# Patient Record
Sex: FEMALE | Race: WHITE | NOT HISPANIC OR LATINO
[De-identification: names, ages, dates, MRNs, and addresses within clinical notes are randomized per-mention and may not be internally consistent; named-entity substitution may affect disease eponyms.]

---

## 2018-01-26 ENCOUNTER — NON-APPOINTMENT (OUTPATIENT)
Age: 33
End: 2018-01-26

## 2018-01-26 ENCOUNTER — APPOINTMENT (OUTPATIENT)
Dept: FAMILY MEDICINE | Facility: CLINIC | Age: 33
End: 2018-01-26
Payer: COMMERCIAL

## 2018-01-26 VITALS
HEART RATE: 74 BPM | OXYGEN SATURATION: 98 % | DIASTOLIC BLOOD PRESSURE: 84 MMHG | HEIGHT: 66 IN | WEIGHT: 192 LBS | BODY MASS INDEX: 30.86 KG/M2 | SYSTOLIC BLOOD PRESSURE: 110 MMHG

## 2018-01-26 DIAGNOSIS — Z86.39 PERSONAL HISTORY OF OTHER ENDOCRINE, NUTRITIONAL AND METABOLIC DISEASE: ICD-10-CM

## 2018-01-26 DIAGNOSIS — Z83.49 FAMILY HISTORY OF OTHER ENDOCRINE, NUTRITIONAL AND METABOLIC DISEASES: ICD-10-CM

## 2018-01-26 DIAGNOSIS — Z82.49 FAMILY HISTORY OF ISCHEMIC HEART DISEASE AND OTHER DISEASES OF THE CIRCULATORY SYSTEM: ICD-10-CM

## 2018-01-26 DIAGNOSIS — Z83.3 FAMILY HISTORY OF DIABETES MELLITUS: ICD-10-CM

## 2018-01-26 DIAGNOSIS — J45.990 EXERCISE INDUCED BRONCHOSPASM: ICD-10-CM

## 2018-01-26 DIAGNOSIS — Z80.0 FAMILY HISTORY OF MALIGNANT NEOPLASM OF DIGESTIVE ORGANS: ICD-10-CM

## 2018-01-26 DIAGNOSIS — Z82.5 FAMILY HISTORY OF ASTHMA AND OTHER CHRONIC LOWER RESPIRATORY DISEASES: ICD-10-CM

## 2018-01-26 DIAGNOSIS — Z83.1 FAMILY HISTORY OF OTHER INFECTIOUS AND PARASITIC DISEASES: ICD-10-CM

## 2018-01-26 PROCEDURE — 36415 COLL VENOUS BLD VENIPUNCTURE: CPT

## 2018-01-26 PROCEDURE — 93000 ELECTROCARDIOGRAM COMPLETE: CPT

## 2018-01-26 PROCEDURE — 99204 OFFICE O/P NEW MOD 45 MIN: CPT | Mod: 25

## 2018-01-29 ENCOUNTER — OTHER (OUTPATIENT)
Age: 33
End: 2018-01-29

## 2018-01-30 ENCOUNTER — OTHER (OUTPATIENT)
Age: 33
End: 2018-01-30

## 2018-01-31 LAB
ALBUMIN SERPL ELPH-MCNC: 4.1 G/DL
ALP BLD-CCNC: 69 U/L
ALT SERPL-CCNC: 16 U/L
ANION GAP SERPL CALC-SCNC: 10 MMOL/L
AST SERPL-CCNC: 19 U/L
BASOPHILS # BLD AUTO: 0.04 K/UL
BASOPHILS NFR BLD AUTO: 0.8 %
BILIRUB SERPL-MCNC: 0.2 MG/DL
BUN SERPL-MCNC: 10 MG/DL
CALCIUM SERPL-MCNC: 9 MG/DL
CHLORIDE SERPL-SCNC: 103 MMOL/L
CO2 SERPL-SCNC: 27 MMOL/L
CREAT SERPL-MCNC: 0.75 MG/DL
EOSINOPHIL # BLD AUTO: 0.4 K/UL
EOSINOPHIL NFR BLD AUTO: 8 %
GLUCOSE SERPL-MCNC: 80 MG/DL
HCT VFR BLD CALC: 38.9 %
HGB BLD-MCNC: 12.4 G/DL
IMM GRANULOCYTES NFR BLD AUTO: 0 %
LYMPHOCYTES # BLD AUTO: 2.41 K/UL
LYMPHOCYTES NFR BLD AUTO: 47.9 %
MAN DIFF?: NORMAL
MCHC RBC-ENTMCNC: 26.3 PG
MCHC RBC-ENTMCNC: 31.9 GM/DL
MCV RBC AUTO: 82.4 FL
MONOCYTES # BLD AUTO: 0.5 K/UL
MONOCYTES NFR BLD AUTO: 9.9 %
NEUTROPHILS # BLD AUTO: 1.68 K/UL
NEUTROPHILS NFR BLD AUTO: 33.4 %
PLATELET # BLD AUTO: 296 K/UL
POTASSIUM SERPL-SCNC: 4.7 MMOL/L
PROT SERPL-MCNC: 7.5 G/DL
RBC # BLD: 4.72 M/UL
RBC # FLD: 13.7 %
SODIUM SERPL-SCNC: 140 MMOL/L
WBC # FLD AUTO: 5.03 K/UL

## 2018-02-08 ENCOUNTER — APPOINTMENT (OUTPATIENT)
Dept: CARDIOLOGY | Facility: CLINIC | Age: 33
End: 2018-02-08
Payer: COMMERCIAL

## 2018-02-08 ENCOUNTER — NON-APPOINTMENT (OUTPATIENT)
Age: 33
End: 2018-02-08

## 2018-02-08 VITALS
OXYGEN SATURATION: 98 % | BODY MASS INDEX: 25.71 KG/M2 | SYSTOLIC BLOOD PRESSURE: 134 MMHG | DIASTOLIC BLOOD PRESSURE: 88 MMHG | HEIGHT: 66 IN | WEIGHT: 160 LBS | HEART RATE: 111 BPM

## 2018-02-08 DIAGNOSIS — Z87.09 PERSONAL HISTORY OF OTHER DISEASES OF THE RESPIRATORY SYSTEM: ICD-10-CM

## 2018-02-08 PROCEDURE — 99244 OFF/OP CNSLTJ NEW/EST MOD 40: CPT | Mod: 25

## 2018-02-08 PROCEDURE — 93000 ELECTROCARDIOGRAM COMPLETE: CPT

## 2018-02-09 ENCOUNTER — APPOINTMENT (OUTPATIENT)
Dept: FAMILY MEDICINE | Facility: CLINIC | Age: 33
End: 2018-02-09
Payer: COMMERCIAL

## 2018-02-09 VITALS
SYSTOLIC BLOOD PRESSURE: 130 MMHG | WEIGHT: 160 LBS | BODY MASS INDEX: 25.71 KG/M2 | TEMPERATURE: 98 F | HEART RATE: 87 BPM | DIASTOLIC BLOOD PRESSURE: 80 MMHG | HEIGHT: 66 IN | OXYGEN SATURATION: 99 %

## 2018-02-09 DIAGNOSIS — N28.1 CYST OF KIDNEY, ACQUIRED: ICD-10-CM

## 2018-02-09 DIAGNOSIS — Z87.442 PERSONAL HISTORY OF URINARY CALCULI: ICD-10-CM

## 2018-02-09 PROCEDURE — 99213 OFFICE O/P EST LOW 20 MIN: CPT | Mod: 25

## 2018-02-09 PROCEDURE — 36415 COLL VENOUS BLD VENIPUNCTURE: CPT

## 2018-02-09 RX ORDER — ALBUTEROL SULFATE 90 UG/1
108 (90 BASE) AEROSOL, METERED RESPIRATORY (INHALATION)
Qty: 18 | Refills: 0 | Status: DISCONTINUED | COMMUNITY
Start: 2017-11-04 | End: 2018-02-09

## 2018-02-09 RX ORDER — COLCHICINE 0.6 MG/1
0.6 TABLET ORAL
Refills: 0 | Status: DISCONTINUED | COMMUNITY
End: 2018-02-09

## 2018-02-11 ENCOUNTER — MOBILE ON CALL (OUTPATIENT)
Age: 33
End: 2018-02-11

## 2018-02-12 ENCOUNTER — TRANSCRIPTION ENCOUNTER (OUTPATIENT)
Age: 33
End: 2018-02-12

## 2018-02-12 LAB
BASOPHILS # BLD AUTO: 0.03 K/UL
BASOPHILS NFR BLD AUTO: 0.6 %
EOSINOPHIL # BLD AUTO: 0.19 K/UL
EOSINOPHIL NFR BLD AUTO: 3.8 %
HCT VFR BLD CALC: 35.7 %
HGB BLD-MCNC: 11.5 G/DL
IMM GRANULOCYTES NFR BLD AUTO: 0.2 %
LYMPHOCYTES # BLD AUTO: 2.11 K/UL
LYMPHOCYTES NFR BLD AUTO: 42.5 %
MAN DIFF?: NORMAL
MCHC RBC-ENTMCNC: 26.4 PG
MCHC RBC-ENTMCNC: 32.2 GM/DL
MCV RBC AUTO: 82.1 FL
MONOCYTES # BLD AUTO: 0.62 K/UL
MONOCYTES NFR BLD AUTO: 12.5 %
NEUTROPHILS # BLD AUTO: 2.01 K/UL
NEUTROPHILS NFR BLD AUTO: 40.4 %
PLATELET # BLD AUTO: 320 K/UL
RBC # BLD: 4.35 M/UL
RBC # FLD: 14.5 %
WBC # FLD AUTO: 4.97 K/UL

## 2018-03-01 ENCOUNTER — APPOINTMENT (OUTPATIENT)
Dept: CARDIOLOGY | Facility: CLINIC | Age: 33
End: 2018-03-01
Payer: COMMERCIAL

## 2018-03-01 PROCEDURE — 93224 XTRNL ECG REC UP TO 48 HRS: CPT

## 2018-03-14 ENCOUNTER — NON-APPOINTMENT (OUTPATIENT)
Age: 33
End: 2018-03-14

## 2018-03-20 ENCOUNTER — NON-APPOINTMENT (OUTPATIENT)
Age: 33
End: 2018-03-20

## 2018-03-22 ENCOUNTER — NON-APPOINTMENT (OUTPATIENT)
Age: 33
End: 2018-03-22

## 2018-07-23 PROBLEM — Z80.0 FAMILY HISTORY OF COLON CANCER: Status: ACTIVE | Noted: 2018-01-26

## 2018-12-17 ENCOUNTER — NON-APPOINTMENT (OUTPATIENT)
Age: 33
End: 2018-12-17

## 2018-12-17 ENCOUNTER — APPOINTMENT (OUTPATIENT)
Dept: FAMILY MEDICINE | Facility: CLINIC | Age: 33
End: 2018-12-17
Payer: COMMERCIAL

## 2018-12-17 VITALS
OXYGEN SATURATION: 98 % | DIASTOLIC BLOOD PRESSURE: 78 MMHG | SYSTOLIC BLOOD PRESSURE: 118 MMHG | WEIGHT: 178 LBS | BODY MASS INDEX: 28.61 KG/M2 | HEART RATE: 78 BPM | HEIGHT: 66 IN

## 2018-12-17 DIAGNOSIS — M25.562 PAIN IN LEFT KNEE: ICD-10-CM

## 2018-12-17 DIAGNOSIS — I31.9 DISEASE OF PERICARDIUM, UNSPECIFIED: ICD-10-CM

## 2018-12-17 DIAGNOSIS — R00.0 TACHYCARDIA, UNSPECIFIED: ICD-10-CM

## 2018-12-17 PROCEDURE — 36415 COLL VENOUS BLD VENIPUNCTURE: CPT

## 2018-12-17 PROCEDURE — 99395 PREV VISIT EST AGE 18-39: CPT | Mod: 25

## 2018-12-17 PROCEDURE — 99385 PREV VISIT NEW AGE 18-39: CPT | Mod: 25

## 2018-12-17 PROCEDURE — 93000 ELECTROCARDIOGRAM COMPLETE: CPT

## 2018-12-17 RX ORDER — VALACYCLOVIR 500 MG/1
500 TABLET, FILM COATED ORAL
Qty: 21 | Refills: 0 | Status: DISCONTINUED | COMMUNITY
Start: 2017-10-18 | End: 2018-12-17

## 2018-12-17 RX ORDER — IBUPROFEN 800 MG/1
800 TABLET, FILM COATED ORAL
Refills: 0 | Status: DISCONTINUED | COMMUNITY
End: 2018-12-17

## 2018-12-18 LAB
25(OH)D3 SERPL-MCNC: 40.9 NG/ML
ALBUMIN SERPL ELPH-MCNC: 4.3 G/DL
ALP BLD-CCNC: 120 U/L
ALT SERPL-CCNC: 16 U/L
ANION GAP SERPL CALC-SCNC: 10 MMOL/L
APPEARANCE: CLEAR
AST SERPL-CCNC: 19 U/L
BACTERIA: NEGATIVE
BASOPHILS # BLD AUTO: 0.04 K/UL
BASOPHILS NFR BLD AUTO: 0.7 %
BILIRUB SERPL-MCNC: 0.3 MG/DL
BILIRUBIN URINE: NEGATIVE
BLOOD URINE: NEGATIVE
BUN SERPL-MCNC: 10 MG/DL
CALCIUM SERPL-MCNC: 9.6 MG/DL
CHLORIDE SERPL-SCNC: 103 MMOL/L
CHOLEST SERPL-MCNC: 175 MG/DL
CHOLEST/HDLC SERPL: 2.7 RATIO
CO2 SERPL-SCNC: 25 MMOL/L
COLOR: YELLOW
CREAT SERPL-MCNC: 0.78 MG/DL
EOSINOPHIL # BLD AUTO: 0.57 K/UL
EOSINOPHIL NFR BLD AUTO: 10.5 %
FERRITIN SERPL-MCNC: 17 NG/ML
FOLATE SERPL-MCNC: >20 NG/ML
GLUCOSE QUALITATIVE U: NEGATIVE MG/DL
GLUCOSE SERPL-MCNC: 76 MG/DL
HBA1C MFR BLD HPLC: 5 %
HCT VFR BLD CALC: 38.9 %
HDLC SERPL-MCNC: 65 MG/DL
HGB BLD-MCNC: 12.1 G/DL
HYALINE CASTS: 2 /LPF
IMM GRANULOCYTES NFR BLD AUTO: 0 %
IRON SATN MFR SERPL: 9 %
IRON SERPL-MCNC: 32 UG/DL
KETONES URINE: NEGATIVE
LDLC SERPL CALC-MCNC: 97 MG/DL
LEUKOCYTE ESTERASE URINE: NEGATIVE
LYMPHOCYTES # BLD AUTO: 2.02 K/UL
LYMPHOCYTES NFR BLD AUTO: 37.1 %
MAN DIFF?: NORMAL
MCHC RBC-ENTMCNC: 26.7 PG
MCHC RBC-ENTMCNC: 31.1 GM/DL
MCV RBC AUTO: 85.9 FL
MICROSCOPIC-UA: NORMAL
MONOCYTES # BLD AUTO: 0.45 K/UL
MONOCYTES NFR BLD AUTO: 8.3 %
NEUTROPHILS # BLD AUTO: 2.37 K/UL
NEUTROPHILS NFR BLD AUTO: 43.4 %
NITRITE URINE: NEGATIVE
PH URINE: 6
PLATELET # BLD AUTO: 245 K/UL
POTASSIUM SERPL-SCNC: 4.2 MMOL/L
PROT SERPL-MCNC: 7.3 G/DL
PROTEIN URINE: NEGATIVE MG/DL
RBC # BLD: 4.53 M/UL
RBC # FLD: 13.7 %
RED BLOOD CELLS URINE: 1 /HPF
SODIUM SERPL-SCNC: 138 MMOL/L
SPECIFIC GRAVITY URINE: 1.01
SQUAMOUS EPITHELIAL CELLS: 1 /HPF
TIBC SERPL-MCNC: 364 UG/DL
TRIGL SERPL-MCNC: 63 MG/DL
TSH SERPL-ACNC: 1.47 UIU/ML
UIBC SERPL-MCNC: 332 UG/DL
UROBILINOGEN URINE: NEGATIVE MG/DL
VIT B12 SERPL-MCNC: 1068 PG/ML
WBC # FLD AUTO: 5.45 K/UL
WHITE BLOOD CELLS URINE: 1 /HPF

## 2019-03-25 ENCOUNTER — OTHER (OUTPATIENT)
Age: 34
End: 2019-03-25

## 2019-03-25 DIAGNOSIS — Z91.018 ALLERGY TO OTHER FOODS: ICD-10-CM

## 2019-03-25 RX ORDER — EPINEPHRINE 0.3 MG/.3ML
0.3 INJECTION INTRAMUSCULAR
Qty: 1 | Refills: 0 | Status: ACTIVE | COMMUNITY
Start: 2017-11-04 | End: 1900-01-01

## 2019-07-15 ENCOUNTER — OTHER (OUTPATIENT)
Age: 34
End: 2019-07-15

## 2019-09-06 ENCOUNTER — TRANSCRIPTION ENCOUNTER (OUTPATIENT)
Age: 34
End: 2019-09-06

## 2020-02-03 ENCOUNTER — TRANSCRIPTION ENCOUNTER (OUTPATIENT)
Age: 35
End: 2020-02-03

## 2020-02-18 ENCOUNTER — APPOINTMENT (OUTPATIENT)
Dept: FAMILY MEDICINE | Facility: CLINIC | Age: 35
End: 2020-02-18
Payer: COMMERCIAL

## 2020-02-18 ENCOUNTER — NON-APPOINTMENT (OUTPATIENT)
Age: 35
End: 2020-02-18

## 2020-02-18 VITALS
HEART RATE: 70 BPM | DIASTOLIC BLOOD PRESSURE: 82 MMHG | HEIGHT: 66 IN | OXYGEN SATURATION: 98 % | BODY MASS INDEX: 26.16 KG/M2 | WEIGHT: 162.8 LBS | SYSTOLIC BLOOD PRESSURE: 120 MMHG

## 2020-02-18 DIAGNOSIS — N92.6 IRREGULAR MENSTRUATION, UNSPECIFIED: ICD-10-CM

## 2020-02-18 PROCEDURE — 99213 OFFICE O/P EST LOW 20 MIN: CPT | Mod: 25

## 2020-02-18 PROCEDURE — 81025 URINE PREGNANCY TEST: CPT

## 2020-02-18 PROCEDURE — 99395 PREV VISIT EST AGE 18-39: CPT | Mod: 25

## 2020-02-18 PROCEDURE — 93000 ELECTROCARDIOGRAM COMPLETE: CPT

## 2020-02-19 LAB
HCG UR QL: POSITIVE
QUALITY CONTROL: YES

## 2020-03-17 DIAGNOSIS — M79.669 PAIN IN UNSPECIFIED LOWER LEG: ICD-10-CM

## 2020-09-21 ENCOUNTER — TRANSCRIPTION ENCOUNTER (OUTPATIENT)
Age: 35
End: 2020-09-21

## 2020-12-16 PROBLEM — Z87.09 HISTORY OF UPPER RESPIRATORY INFECTION: Status: RESOLVED | Noted: 2018-01-26 | Resolved: 2020-12-16

## 2021-02-22 ENCOUNTER — APPOINTMENT (OUTPATIENT)
Dept: FAMILY MEDICINE | Facility: CLINIC | Age: 36
End: 2021-02-22
Payer: COMMERCIAL

## 2021-02-22 VITALS
SYSTOLIC BLOOD PRESSURE: 122 MMHG | HEIGHT: 66 IN | TEMPERATURE: 97.2 F | HEART RATE: 79 BPM | BODY MASS INDEX: 29.25 KG/M2 | OXYGEN SATURATION: 98 % | DIASTOLIC BLOOD PRESSURE: 80 MMHG | WEIGHT: 182 LBS

## 2021-02-22 DIAGNOSIS — Z13.220 ENCOUNTER FOR SCREENING FOR LIPOID DISORDERS: ICD-10-CM

## 2021-02-22 DIAGNOSIS — Z13.29 ENCOUNTER FOR SCREENING FOR OTHER SUSPECTED ENDOCRINE DISORDER: ICD-10-CM

## 2021-02-22 DIAGNOSIS — Z13.21 ENCOUNTER FOR SCREENING FOR NUTRITIONAL DISORDER: ICD-10-CM

## 2021-02-22 DIAGNOSIS — Z13.1 ENCOUNTER FOR SCREENING FOR DIABETES MELLITUS: ICD-10-CM

## 2021-02-22 DIAGNOSIS — Z00.00 ENCOUNTER FOR GENERAL ADULT MEDICAL EXAMINATION W/OUT ABNORMAL FINDINGS: ICD-10-CM

## 2021-02-22 PROCEDURE — 36415 COLL VENOUS BLD VENIPUNCTURE: CPT

## 2021-02-22 PROCEDURE — 99072 ADDL SUPL MATRL&STAF TM PHE: CPT

## 2021-02-22 PROCEDURE — 99395 PREV VISIT EST AGE 18-39: CPT | Mod: 25

## 2021-02-22 RX ORDER — VALACYCLOVIR 500 MG/1
500 TABLET, FILM COATED ORAL
Refills: 0 | Status: ACTIVE | COMMUNITY

## 2021-02-22 NOTE — HISTORY OF PRESENT ILLNESS
[FreeTextEntry1] : patient here for complete physical [de-identified] : no chest pain, no sob, no cough, no fever, no dizziness, no abdominal pain, no n/v/d/c/melena/brbpr/hematuria/dysuria\par

## 2021-02-22 NOTE — HEALTH RISK ASSESSMENT
[Good] : ~his/her~  mood as  good [No falls in past year] : Patient reported no falls in the past year [No] : In the past 12 months have you used drugs other than those required for medical reasons? No [0] : 2) Feeling down, depressed, or hopeless: Not at all (0) [Patient reported PAP Smear was normal] : Patient reported PAP Smear was normal [None] : None [With Significant Other] : lives with significant other [Employed] : employed [Graduate School] : graduate school [] :  [# Of Children ___] : has [unfilled] children [Sexually Active] : sexually active [Feels Safe at Home] : Feels safe at home [Fully functional (bathing, dressing, toileting, transferring, walking, feeding)] : Fully functional (bathing, dressing, toileting, transferring, walking, feeding) [Fully functional (using the telephone, shopping, preparing meals, housekeeping, doing laundry, using] : Fully functional and needs no help or supervision to perform IADLs (using the telephone, shopping, preparing meals, housekeeping, doing laundry, using transportation, managing medications and managing finances) [] : No [Reports changes in hearing] : Reports no changes in hearing [Reports changes in vision] : Reports no changes in vision [Reports changes in dental health] : Reports no changes in dental health [PapSmearDate] : 7/2020 [FreeTextEntry2] : [place]

## 2021-02-23 LAB
25(OH)D3 SERPL-MCNC: 35.8 NG/ML
ALBUMIN SERPL ELPH-MCNC: 4.5 G/DL
ALP BLD-CCNC: 120 U/L
ALT SERPL-CCNC: 17 U/L
ANION GAP SERPL CALC-SCNC: 13 MMOL/L
APPEARANCE: CLEAR
AST SERPL-CCNC: 21 U/L
BACTERIA: NEGATIVE
BASOPHILS # BLD AUTO: 0.05 K/UL
BASOPHILS NFR BLD AUTO: 1 %
BILIRUB SERPL-MCNC: 0.4 MG/DL
BILIRUBIN URINE: NEGATIVE
BLOOD URINE: NEGATIVE
BUN SERPL-MCNC: 14 MG/DL
CALCIUM SERPL-MCNC: 9.7 MG/DL
CHLORIDE SERPL-SCNC: 101 MMOL/L
CHOLEST SERPL-MCNC: 163 MG/DL
CO2 SERPL-SCNC: 23 MMOL/L
COLOR: NORMAL
CREAT SERPL-MCNC: 0.92 MG/DL
EOSINOPHIL # BLD AUTO: 0.46 K/UL
EOSINOPHIL NFR BLD AUTO: 9.3 %
ESTIMATED AVERAGE GLUCOSE: 108 MG/DL
FERRITIN SERPL-MCNC: 62 NG/ML
FOLATE SERPL-MCNC: >20 NG/ML
GLUCOSE QUALITATIVE U: NEGATIVE
GLUCOSE SERPL-MCNC: 69 MG/DL
HBA1C MFR BLD HPLC: 5.4 %
HCT VFR BLD CALC: 42.7 %
HDLC SERPL-MCNC: 57 MG/DL
HGB BLD-MCNC: 13.9 G/DL
HYALINE CASTS: 1 /LPF
IMM GRANULOCYTES NFR BLD AUTO: 0 %
IRON SATN MFR SERPL: 30 %
IRON SERPL-MCNC: 98 UG/DL
KETONES URINE: NEGATIVE
LDLC SERPL CALC-MCNC: 95 MG/DL
LEUKOCYTE ESTERASE URINE: NEGATIVE
LYMPHOCYTES # BLD AUTO: 2.08 K/UL
LYMPHOCYTES NFR BLD AUTO: 42.1 %
MAN DIFF?: NORMAL
MCHC RBC-ENTMCNC: 29.4 PG
MCHC RBC-ENTMCNC: 32.6 GM/DL
MCV RBC AUTO: 90.3 FL
MICROSCOPIC-UA: NORMAL
MONOCYTES # BLD AUTO: 0.45 K/UL
MONOCYTES NFR BLD AUTO: 9.1 %
NEUTROPHILS # BLD AUTO: 1.9 K/UL
NEUTROPHILS NFR BLD AUTO: 38.5 %
NITRITE URINE: NEGATIVE
NONHDLC SERPL-MCNC: 106 MG/DL
PH URINE: 6
PLATELET # BLD AUTO: 263 K/UL
POTASSIUM SERPL-SCNC: 4.5 MMOL/L
PROT SERPL-MCNC: 7.6 G/DL
PROTEIN URINE: NEGATIVE
RBC # BLD: 4.73 M/UL
RBC # FLD: 13.2 %
RED BLOOD CELLS URINE: 1 /HPF
SARS-COV-2 IGG SERPL IA-ACNC: 0.07 INDEX
SARS-COV-2 IGG SERPL QL IA: NEGATIVE
SODIUM SERPL-SCNC: 137 MMOL/L
SPECIFIC GRAVITY URINE: 1.01
SQUAMOUS EPITHELIAL CELLS: 2 /HPF
TIBC SERPL-MCNC: 325 UG/DL
TRIGL SERPL-MCNC: 56 MG/DL
TSH SERPL-ACNC: 1.27 UIU/ML
UIBC SERPL-MCNC: 227 UG/DL
UROBILINOGEN URINE: NORMAL
VIT B12 SERPL-MCNC: 779 PG/ML
WBC # FLD AUTO: 4.94 K/UL
WHITE BLOOD CELLS URINE: 0 /HPF

## 2021-02-24 ENCOUNTER — NON-APPOINTMENT (OUTPATIENT)
Age: 36
End: 2021-02-24

## 2021-06-21 ENCOUNTER — LABORATORY RESULT (OUTPATIENT)
Age: 36
End: 2021-06-21

## 2021-06-21 ENCOUNTER — APPOINTMENT (OUTPATIENT)
Dept: FAMILY MEDICINE | Facility: CLINIC | Age: 36
End: 2021-06-21
Payer: COMMERCIAL

## 2021-06-21 VITALS
TEMPERATURE: 97.4 F | OXYGEN SATURATION: 98 % | HEIGHT: 66 IN | DIASTOLIC BLOOD PRESSURE: 82 MMHG | BODY MASS INDEX: 27.32 KG/M2 | HEART RATE: 72 BPM | WEIGHT: 170 LBS | SYSTOLIC BLOOD PRESSURE: 128 MMHG

## 2021-06-21 DIAGNOSIS — Z11.59 ENCOUNTER FOR SCREENING FOR OTHER VIRAL DISEASES: ICD-10-CM

## 2021-06-21 PROCEDURE — 99214 OFFICE O/P EST MOD 30 MIN: CPT | Mod: 25

## 2021-06-21 PROCEDURE — 36415 COLL VENOUS BLD VENIPUNCTURE: CPT

## 2021-06-21 PROCEDURE — 99072 ADDL SUPL MATRL&STAF TM PHE: CPT

## 2021-06-21 NOTE — HISTORY OF PRESENT ILLNESS
[FreeTextEntry1] : positive hep c ab and elvated alk phos  [de-identified] : patient had needle stick on 06/04/2021 at work which they did full work up and they noticed abnormal lab tests which patient was told to follow up with PCP. elevated alk phos  and positive hepatitis c\par no chest pain, no sob, no cough, no fever, no dizziness, no abdominal pain, no n/v/d/c/melena/brbpr/hematuria/dysuria\par

## 2021-06-21 NOTE — PHYSICAL EXAM
[No Acute Distress] : no acute distress [Well Nourished] : well nourished [Well Developed] : well developed [Well-Appearing] : well-appearing [Normal Voice/Communication] : normal voice/communication [Normal Sclera/Conjunctiva] : normal sclera/conjunctiva [PERRL] : pupils equal round and reactive to light [Normal Outer Ear/Nose] : the outer ears and nose were normal in appearance [No JVD] : no jugular venous distention [No Respiratory Distress] : no respiratory distress  [No Accessory Muscle Use] : no accessory muscle use [Clear to Auscultation] : lungs were clear to auscultation bilaterally [Normal Rate] : normal rate  [Regular Rhythm] : with a regular rhythm [Normal S1, S2] : normal S1 and S2 [No Murmur] : no murmur heard [No Carotid Bruits] : no carotid bruits [No Abdominal Bruit] : a ~M bruit was not heard ~T in the abdomen [No Varicosities] : no varicosities [Pedal Pulses Present] : the pedal pulses are present [No Edema] : there was no peripheral edema [No Palpable Aorta] : no palpable aorta [No Extremity Clubbing/Cyanosis] : no extremity clubbing/cyanosis [Soft] : abdomen soft [Non Tender] : non-tender [Non-distended] : non-distended [No Masses] : no abdominal mass palpated [No HSM] : no HSM [Normal Bowel Sounds] : normal bowel sounds [No CVA Tenderness] : no CVA  tenderness [No Spinal Tenderness] : no spinal tenderness [Grossly Normal Strength/Tone] : grossly normal strength/tone [No Rash] : no rash [Coordination Grossly Intact] : coordination grossly intact [No Focal Deficits] : no focal deficits [Normal Gait] : normal gait [Speech Grossly Normal] : speech grossly normal [Normal Affect] : the affect was normal [Alert and Oriented x3] : oriented to person, place, and time [Normal Mood] : the mood was normal [Normal Insight/Judgement] : insight and judgment were intact

## 2021-06-24 ENCOUNTER — NON-APPOINTMENT (OUTPATIENT)
Age: 36
End: 2021-06-24

## 2022-01-19 ENCOUNTER — NON-APPOINTMENT (OUTPATIENT)
Age: 37
End: 2022-01-19

## 2022-01-19 ENCOUNTER — APPOINTMENT (OUTPATIENT)
Dept: FAMILY MEDICINE | Facility: CLINIC | Age: 37
End: 2022-01-19
Payer: COMMERCIAL

## 2022-01-19 VITALS
SYSTOLIC BLOOD PRESSURE: 108 MMHG | DIASTOLIC BLOOD PRESSURE: 70 MMHG | OXYGEN SATURATION: 96 % | BODY MASS INDEX: 27.44 KG/M2 | TEMPERATURE: 97.2 F | HEART RATE: 80 BPM | WEIGHT: 170 LBS

## 2022-01-19 DIAGNOSIS — Z86.19 PERSONAL HISTORY OF OTHER INFECTIOUS AND PARASITIC DISEASES: ICD-10-CM

## 2022-01-19 DIAGNOSIS — R74.8 ABNORMAL LEVELS OF OTHER SERUM ENZYMES: ICD-10-CM

## 2022-01-19 DIAGNOSIS — R76.8 OTHER SPECIFIED ABNORMAL IMMUNOLOGICAL FINDINGS IN SERUM: ICD-10-CM

## 2022-01-19 DIAGNOSIS — Z11.59 ENCOUNTER FOR SCREENING FOR OTHER VIRAL DISEASES: ICD-10-CM

## 2022-01-19 PROCEDURE — 99214 OFFICE O/P EST MOD 30 MIN: CPT | Mod: 25

## 2022-01-19 PROCEDURE — 36415 COLL VENOUS BLD VENIPUNCTURE: CPT

## 2022-01-19 NOTE — HISTORY OF PRESENT ILLNESS
[FreeTextEntry1] : patient here for a follow up [de-identified] : no chest pain, no sob, no cough, no fever, no dizziness, no abdominal pain, no n/v/d/c/melena/brbpr/hematuria/dysuria\par would like refill of valtrex for genital herpes - states gets intermittent outbreak around her menstrual cycle

## 2022-01-20 LAB
25(OH)D3 SERPL-MCNC: 31 NG/ML
ALBUMIN SERPL ELPH-MCNC: 4.6 G/DL
ALP BLD-CCNC: 103 U/L
ALT SERPL-CCNC: 13 U/L
ANION GAP SERPL CALC-SCNC: 14 MMOL/L
APPEARANCE: CLEAR
AST SERPL-CCNC: 18 U/L
BACTERIA: ABNORMAL
BASOPHILS # BLD AUTO: 0.06 K/UL
BASOPHILS NFR BLD AUTO: 1.1 %
BILIRUB SERPL-MCNC: 0.4 MG/DL
BILIRUBIN URINE: NEGATIVE
BLOOD URINE: NEGATIVE
BUN SERPL-MCNC: 13 MG/DL
CALCIUM SERPL-MCNC: 9.5 MG/DL
CHLORIDE SERPL-SCNC: 103 MMOL/L
CHOLEST SERPL-MCNC: 171 MG/DL
CO2 SERPL-SCNC: 24 MMOL/L
COLOR: YELLOW
COVID-19 NUCLEOCAPSID  GAM ANTIBODY INTERPRETATION: NEGATIVE
COVID-19 SPIKE DOMAIN ANTIBODY INTERPRETATION: POSITIVE
CREAT SERPL-MCNC: 0.72 MG/DL
EOSINOPHIL # BLD AUTO: 0.42 K/UL
EOSINOPHIL NFR BLD AUTO: 7.6 %
ESTIMATED AVERAGE GLUCOSE: 103 MG/DL
GLUCOSE QUALITATIVE U: NEGATIVE
GLUCOSE SERPL-MCNC: 82 MG/DL
HBA1C MFR BLD HPLC: 5.2 %
HBV CORE IGG+IGM SER QL: NONREACTIVE
HBV CORE IGM SER QL: NONREACTIVE
HBV SURFACE AB SER QL: REACTIVE
HBV SURFACE AG SER QL: NONREACTIVE
HCT VFR BLD CALC: 43.3 %
HCV AB SER QL: NONREACTIVE
HCV S/CO RATIO: 0.16 S/CO
HDLC SERPL-MCNC: 66 MG/DL
HGB BLD-MCNC: 14 G/DL
HIV1+2 AB SPEC QL IA.RAPID: NONREACTIVE
HYALINE CASTS: 2 /LPF
IMM GRANULOCYTES NFR BLD AUTO: 0.2 %
KETONES URINE: NEGATIVE
LDLC SERPL CALC-MCNC: 91 MG/DL
LEUKOCYTE ESTERASE URINE: NEGATIVE
LYMPHOCYTES # BLD AUTO: 2.02 K/UL
LYMPHOCYTES NFR BLD AUTO: 36.7 %
MAN DIFF?: NORMAL
MCHC RBC-ENTMCNC: 29.2 PG
MCHC RBC-ENTMCNC: 32.3 GM/DL
MCV RBC AUTO: 90.4 FL
MICROSCOPIC-UA: NORMAL
MONOCYTES # BLD AUTO: 0.39 K/UL
MONOCYTES NFR BLD AUTO: 7.1 %
NEUTROPHILS # BLD AUTO: 2.6 K/UL
NEUTROPHILS NFR BLD AUTO: 47.3 %
NITRITE URINE: NEGATIVE
NONHDLC SERPL-MCNC: 105 MG/DL
PH URINE: 7.5
PLATELET # BLD AUTO: 277 K/UL
POTASSIUM SERPL-SCNC: 4.2 MMOL/L
PROT SERPL-MCNC: 7.6 G/DL
PROTEIN URINE: NEGATIVE
RBC # BLD: 4.79 M/UL
RBC # FLD: 12.7 %
RED BLOOD CELLS URINE: 1 /HPF
SARS-COV-2 AB SERPL IA-ACNC: >250 U/ML
SARS-COV-2 AB SERPL QL IA: 0.07 INDEX
SODIUM SERPL-SCNC: 140 MMOL/L
SPECIFIC GRAVITY URINE: 1.01
SQUAMOUS EPITHELIAL CELLS: 3 /HPF
T PALLIDUM AB SER QL IA: NEGATIVE
TRIGL SERPL-MCNC: 66 MG/DL
TSH SERPL-ACNC: 1.49 UIU/ML
UROBILINOGEN URINE: NORMAL
WBC # FLD AUTO: 5.5 K/UL
WHITE BLOOD CELLS URINE: 1 /HPF

## 2022-01-21 ENCOUNTER — TRANSCRIPTION ENCOUNTER (OUTPATIENT)
Age: 37
End: 2022-01-21

## 2022-01-24 LAB
ALP BLD-CCNC: 104 IU/L
ALP BONE CFR SERPL: 38 %
ALP INTEST CFR SERPL: 5 %
ALP LIVER CFR SERPL: 57 %

## 2022-01-31 ENCOUNTER — TRANSCRIPTION ENCOUNTER (OUTPATIENT)
Age: 37
End: 2022-01-31

## 2022-02-22 ENCOUNTER — TRANSCRIPTION ENCOUNTER (OUTPATIENT)
Age: 37
End: 2022-02-22

## 2022-02-22 RX ORDER — VALACYCLOVIR 500 MG/1
500 TABLET, FILM COATED ORAL TWICE DAILY
Qty: 90 | Refills: 0 | Status: ACTIVE | COMMUNITY
Start: 2022-01-19 | End: 1900-01-01

## 2022-06-19 ENCOUNTER — NON-APPOINTMENT (OUTPATIENT)
Age: 37
End: 2022-06-19

## 2022-06-30 ENCOUNTER — APPOINTMENT (OUTPATIENT)
Dept: FAMILY MEDICINE | Facility: CLINIC | Age: 37
End: 2022-06-30

## 2022-06-30 ENCOUNTER — NON-APPOINTMENT (OUTPATIENT)
Age: 37
End: 2022-06-30

## 2022-06-30 VITALS
DIASTOLIC BLOOD PRESSURE: 74 MMHG | SYSTOLIC BLOOD PRESSURE: 106 MMHG | BODY MASS INDEX: 27.44 KG/M2 | TEMPERATURE: 97.2 F | HEART RATE: 86 BPM | OXYGEN SATURATION: 97 % | WEIGHT: 170 LBS

## 2022-06-30 PROCEDURE — 99214 OFFICE O/P EST MOD 30 MIN: CPT | Mod: 25

## 2022-06-30 PROCEDURE — 36415 COLL VENOUS BLD VENIPUNCTURE: CPT

## 2022-06-30 NOTE — HISTORY OF PRESENT ILLNESS
[FreeTextEntry8] : patient feeling dizzy for about month\par admits 1 month dizziness with going from sitting or bending to standing , no headaches . no chest pain, no sob, no cough, no fever, no dizziness, no abdominal pain, no n/v/d/c/melena/brbpr/hematuria/dysuria\par \par

## 2022-07-05 LAB
ALBUMIN SERPL ELPH-MCNC: 4 G/DL
ALP BLD-CCNC: 93 U/L
ALT SERPL-CCNC: 15 U/L
ANION GAP SERPL CALC-SCNC: 13 MMOL/L
AST SERPL-CCNC: 30 U/L
BASOPHILS # BLD AUTO: 0.05 K/UL
BASOPHILS NFR BLD AUTO: 0.9 %
BILIRUB SERPL-MCNC: 0.3 MG/DL
BUN SERPL-MCNC: 13 MG/DL
CALCIUM SERPL-MCNC: 9.3 MG/DL
CHLORIDE SERPL-SCNC: 105 MMOL/L
CO2 SERPL-SCNC: 21 MMOL/L
CREAT SERPL-MCNC: 0.65 MG/DL
EGFR: 117 ML/MIN/1.73M2
EOSINOPHIL # BLD AUTO: 0.5 K/UL
EOSINOPHIL NFR BLD AUTO: 8.8 %
GLUCOSE SERPL-MCNC: 92 MG/DL
HCT VFR BLD CALC: 42.3 %
HGB BLD-MCNC: 13.4 G/DL
IMM GRANULOCYTES NFR BLD AUTO: 0.2 %
LYMPHOCYTES # BLD AUTO: 1.99 K/UL
LYMPHOCYTES NFR BLD AUTO: 35.2 %
MAN DIFF?: NORMAL
MCHC RBC-ENTMCNC: 29.1 PG
MCHC RBC-ENTMCNC: 31.7 GM/DL
MCV RBC AUTO: 91.8 FL
MONOCYTES # BLD AUTO: 0.5 K/UL
MONOCYTES NFR BLD AUTO: 8.8 %
NEUTROPHILS # BLD AUTO: 2.6 K/UL
NEUTROPHILS NFR BLD AUTO: 46.1 %
PLATELET # BLD AUTO: 280 K/UL
POTASSIUM SERPL-SCNC: 5.1 MMOL/L
PROT SERPL-MCNC: 7.3 G/DL
RBC # BLD: 4.61 M/UL
RBC # FLD: 13.2 %
SODIUM SERPL-SCNC: 140 MMOL/L
WBC # FLD AUTO: 5.65 K/UL

## 2022-07-21 ENCOUNTER — NON-APPOINTMENT (OUTPATIENT)
Age: 37
End: 2022-07-21

## 2022-07-21 ENCOUNTER — APPOINTMENT (OUTPATIENT)
Dept: CARDIOLOGY | Facility: CLINIC | Age: 37
End: 2022-07-21

## 2022-07-21 VITALS
HEIGHT: 66 IN | HEART RATE: 84 BPM | BODY MASS INDEX: 27.32 KG/M2 | DIASTOLIC BLOOD PRESSURE: 78 MMHG | WEIGHT: 170 LBS | SYSTOLIC BLOOD PRESSURE: 120 MMHG

## 2022-07-21 DIAGNOSIS — R42 DIZZINESS AND GIDDINESS: ICD-10-CM

## 2022-07-21 PROCEDURE — 93000 ELECTROCARDIOGRAM COMPLETE: CPT

## 2022-07-21 PROCEDURE — 99204 OFFICE O/P NEW MOD 45 MIN: CPT | Mod: 25

## 2022-07-21 NOTE — HISTORY OF PRESENT ILLNESS
[FreeTextEntry1] : Pt is a 37 y/o F who is referred here today by their PCP for evaluation of dizziness. For the past few months she has felt dizzy when she changes position quickly (usually squatting to standing) which lasts seconds.  Usually pt is out in the heat gardening when this happens.  She denies CP, SOB, diaphoresis, palpitations, syncope, LE edema, PND, orthopnea. \par TTE done 01/24/2018 showed normal LV function EF = 55-60%, no valvular pathology\par COVID vaccine 08/2021\par \par PMH: none\par Smoking status: never\par Current exercise: 2-3 days per week elliptical\par Daily water intake: 64 oz\par Daily caffeine intake: none recently\par OTC medications: ibuprofen PRN\par Family hx: father CABG 73, mother lung Ca, brother HTN/HLD\par Previous hospitalizations: never\par

## 2022-07-21 NOTE — DISCUSSION/SUMMARY
[FreeTextEntry1] : Pt is a 37 y/o F with postural dizziness\par Given pt's symptoms will check espinoza monitor to assess for ectopy.  Advised adequate hydration.  Drug use, OTC medication use, caffeine consumption reviewed\par .ehco\par will perform ETT to assess patient's current cardiac reserve to incremental activity and check for provocable ECG changes.\par Advise to stay well hydrated, change position slowly, avoid heat\par Advised pt to go to the nearest ED if symptoms persist or worsen\par The described plan was discussed with the pt.  All questions and concerns were addressed to the best of my knowledge.\par

## 2022-07-21 NOTE — PHYSICAL EXAM
[Well Developed] : well developed [Well Nourished] : well nourished [No Acute Distress] : no acute distress [Normal Conjunctiva] : normal conjunctiva [Normal Venous Pressure] : normal venous pressure [No Carotid Bruit] : no carotid bruit [Normal S1, S2] : normal S1, S2 [No Murmur] : no murmur [No Rub] : no rub [No Gallop] : no gallop [Clear Lung Fields] : clear lung fields [Good Air Entry] : good air entry [No Respiratory Distress] : no respiratory distress  [Soft] : abdomen soft [Non Tender] : non-tender [No Masses/organomegaly] : no masses/organomegaly [Normal Bowel Sounds] : normal bowel sounds [Normal Gait] : normal gait [No Edema] : no edema [No Cyanosis] : no cyanosis show [No Clubbing] : no clubbing [No Varicosities] : no varicosities [No Rash] : no rash [No Skin Lesions] : no skin lesions [Moves all extremities] : moves all extremities [No Focal Deficits] : no focal deficits [Normal Speech] : normal speech [Alert and Oriented] : alert and oriented [Normal memory] : normal memory

## 2022-08-10 ENCOUNTER — APPOINTMENT (OUTPATIENT)
Age: 37
End: 2022-08-10

## 2022-08-17 ENCOUNTER — APPOINTMENT (OUTPATIENT)
Dept: CARDIOLOGY | Facility: CLINIC | Age: 37
End: 2022-08-17

## 2023-09-27 ENCOUNTER — APPOINTMENT (OUTPATIENT)
Dept: FAMILY MEDICINE | Facility: CLINIC | Age: 38
End: 2023-09-27
Payer: COMMERCIAL

## 2023-09-27 VITALS
WEIGHT: 165 LBS | DIASTOLIC BLOOD PRESSURE: 84 MMHG | HEIGHT: 66 IN | OXYGEN SATURATION: 96 % | HEART RATE: 90 BPM | SYSTOLIC BLOOD PRESSURE: 120 MMHG | BODY MASS INDEX: 26.52 KG/M2

## 2023-09-27 DIAGNOSIS — D70.9 NEUTROPENIA, UNSPECIFIED: ICD-10-CM

## 2023-09-27 DIAGNOSIS — D64.9 ANEMIA, UNSPECIFIED: ICD-10-CM

## 2023-09-27 DIAGNOSIS — J15.9 UNSPECIFIED BACTERIAL PNEUMONIA: ICD-10-CM

## 2023-09-27 PROCEDURE — 99213 OFFICE O/P EST LOW 20 MIN: CPT

## 2023-09-28 LAB
ALBUMIN SERPL ELPH-MCNC: 4.3 G/DL
ALP BLD-CCNC: 91 U/L
ALT SERPL-CCNC: 26 U/L
ANION GAP SERPL CALC-SCNC: 12 MMOL/L
AST SERPL-CCNC: 20 U/L
BASOPHILS # BLD AUTO: 0.07 K/UL
BASOPHILS NFR BLD AUTO: 1.1 %
BILIRUB SERPL-MCNC: 0.2 MG/DL
BUN SERPL-MCNC: 12 MG/DL
CALCIUM SERPL-MCNC: 9.8 MG/DL
CHLORIDE SERPL-SCNC: 101 MMOL/L
CHOLEST SERPL-MCNC: 143 MG/DL
CO2 SERPL-SCNC: 26 MMOL/L
CREAT SERPL-MCNC: 0.75 MG/DL
EGFR: 104 ML/MIN/1.73M2
EOSINOPHIL # BLD AUTO: 0.41 K/UL
EOSINOPHIL NFR BLD AUTO: 6.3 %
GLUCOSE SERPL-MCNC: 78 MG/DL
HCT VFR BLD CALC: 38 %
HDLC SERPL-MCNC: 40 MG/DL
HGB BLD-MCNC: 11.4 G/DL
IMM GRANULOCYTES NFR BLD AUTO: 0.2 %
LDLC SERPL CALC-MCNC: 88 MG/DL
LYMPHOCYTES # BLD AUTO: 2.47 K/UL
LYMPHOCYTES NFR BLD AUTO: 38.2 %
MAN DIFF?: NORMAL
MCHC RBC-ENTMCNC: 24.4 PG
MCHC RBC-ENTMCNC: 30 GM/DL
MCV RBC AUTO: 81.4 FL
MONOCYTES # BLD AUTO: 0.57 K/UL
MONOCYTES NFR BLD AUTO: 8.8 %
NEUTROPHILS # BLD AUTO: 2.93 K/UL
NEUTROPHILS NFR BLD AUTO: 45.4 %
NONHDLC SERPL-MCNC: 103 MG/DL
PLATELET # BLD AUTO: 357 K/UL
POTASSIUM SERPL-SCNC: 4.4 MMOL/L
PROT SERPL-MCNC: 7.5 G/DL
RBC # BLD: 4.67 M/UL
RBC # FLD: 14.6 %
SODIUM SERPL-SCNC: 139 MMOL/L
TRIGL SERPL-MCNC: 75 MG/DL
TSH SERPL-ACNC: 1.1 UIU/ML
WBC # FLD AUTO: 6.46 K/UL

## 2023-09-29 LAB
APPEARANCE: CLEAR
BACTERIA: NEGATIVE /HPF
BILIRUBIN URINE: NEGATIVE
BLOOD URINE: NEGATIVE
CAST: 0 /LPF
COLOR: YELLOW
EPITHELIAL CELLS: 1 /HPF
ESTIMATED AVERAGE GLUCOSE: 114 MG/DL
GLUCOSE QUALITATIVE U: NEGATIVE MG/DL
HBA1C MFR BLD HPLC: 5.6 %
KETONES URINE: NEGATIVE MG/DL
LEUKOCYTE ESTERASE URINE: NEGATIVE
MICROSCOPIC-UA: NORMAL
NITRITE URINE: NEGATIVE
PH URINE: 6.5
PROTEIN URINE: NEGATIVE MG/DL
RED BLOOD CELLS URINE: 0 /HPF
SPECIFIC GRAVITY URINE: 1.01
UROBILINOGEN URINE: 0.2 MG/DL
WHITE BLOOD CELLS URINE: 0 /HPF

## 2023-10-26 LAB
BASOPHILS # BLD AUTO: 0.08 K/UL
BASOPHILS NFR BLD AUTO: 0.9 %
EOSINOPHIL # BLD AUTO: 0.44 K/UL
EOSINOPHIL NFR BLD AUTO: 5.1 %
FERRITIN SERPL-MCNC: 10 NG/ML
FOLATE SERPL-MCNC: 15.1 NG/ML
HCT VFR BLD CALC: 38.9 %
HGB BLD-MCNC: 12.3 G/DL
IMM GRANULOCYTES NFR BLD AUTO: 0.2 %
IRON SATN MFR SERPL: 13 %
IRON SERPL-MCNC: 55 UG/DL
LYMPHOCYTES # BLD AUTO: 3.43 K/UL
LYMPHOCYTES NFR BLD AUTO: 40 %
MAN DIFF?: NORMAL
MCHC RBC-ENTMCNC: 25.3 PG
MCHC RBC-ENTMCNC: 31.6 GM/DL
MCV RBC AUTO: 80 FL
MONOCYTES # BLD AUTO: 0.73 K/UL
MONOCYTES NFR BLD AUTO: 8.5 %
NEUTROPHILS # BLD AUTO: 3.87 K/UL
NEUTROPHILS NFR BLD AUTO: 45.3 %
PLATELET # BLD AUTO: 309 K/UL
RBC # BLD: 4.86 M/UL
RBC # FLD: 14.6 %
TIBC SERPL-MCNC: 437 UG/DL
UIBC SERPL-MCNC: 382 UG/DL
VIT B12 SERPL-MCNC: 960 PG/ML
WBC # FLD AUTO: 8.57 K/UL

## 2023-11-11 ENCOUNTER — NON-APPOINTMENT (OUTPATIENT)
Age: 38
End: 2023-11-11

## 2023-12-12 ENCOUNTER — APPOINTMENT (OUTPATIENT)
Age: 38
End: 2023-12-12
Payer: COMMERCIAL

## 2023-12-12 VITALS
SYSTOLIC BLOOD PRESSURE: 150 MMHG | BODY MASS INDEX: 26.63 KG/M2 | HEART RATE: 74 BPM | OXYGEN SATURATION: 98 % | WEIGHT: 165 LBS | DIASTOLIC BLOOD PRESSURE: 93 MMHG

## 2023-12-12 DIAGNOSIS — R10.2 PELVIC AND PERINEAL PAIN: ICD-10-CM

## 2023-12-12 DIAGNOSIS — R35.0 FREQUENCY OF MICTURITION: ICD-10-CM

## 2023-12-12 DIAGNOSIS — K62.9 DISEASE OF ANUS AND RECTUM, UNSPECIFIED: ICD-10-CM

## 2023-12-12 DIAGNOSIS — N81.89 OTHER FEMALE GENITAL PROLAPSE: ICD-10-CM

## 2023-12-12 LAB
BILIRUB UR QL STRIP: NEGATIVE
CLARITY UR: CLEAR
COLLECTION METHOD: NORMAL
GLUCOSE UR-MCNC: NEGATIVE
HCG UR QL: 0.2 EU/DL
HGB UR QL STRIP.AUTO: NEGATIVE
KETONES UR-MCNC: NEGATIVE
LEUKOCYTE ESTERASE UR QL STRIP: NEGATIVE
NITRITE UR QL STRIP: NEGATIVE
PH UR STRIP: 6.5
PROT UR STRIP-MCNC: NEGATIVE
SP GR UR STRIP: 1.02

## 2023-12-12 PROCEDURE — 51701 INSERT BLADDER CATHETER: CPT | Mod: 59

## 2023-12-12 PROCEDURE — 99204 OFFICE O/P NEW MOD 45 MIN: CPT | Mod: 25

## 2023-12-12 PROCEDURE — 81003 URINALYSIS AUTO W/O SCOPE: CPT | Mod: QW

## 2024-01-31 ENCOUNTER — TRANSCRIPTION ENCOUNTER (OUTPATIENT)
Age: 39
End: 2024-01-31

## 2024-08-14 ENCOUNTER — APPOINTMENT (OUTPATIENT)
Dept: INTERNAL MEDICINE | Facility: CLINIC | Age: 39
End: 2024-08-14
Payer: COMMERCIAL

## 2024-08-14 VITALS
WEIGHT: 179 LBS | BODY MASS INDEX: 28.89 KG/M2 | DIASTOLIC BLOOD PRESSURE: 70 MMHG | HEART RATE: 79 BPM | OXYGEN SATURATION: 97 % | TEMPERATURE: 97.6 F | SYSTOLIC BLOOD PRESSURE: 112 MMHG

## 2024-08-14 DIAGNOSIS — Z00.00 ENCOUNTER FOR GENERAL ADULT MEDICAL EXAMINATION W/OUT ABNORMAL FINDINGS: ICD-10-CM

## 2024-08-14 DIAGNOSIS — Z13.220 ENCOUNTER FOR SCREENING FOR LIPOID DISORDERS: ICD-10-CM

## 2024-08-14 DIAGNOSIS — Z13.1 ENCOUNTER FOR SCREENING FOR DIABETES MELLITUS: ICD-10-CM

## 2024-08-14 DIAGNOSIS — Z13.29 ENCOUNTER FOR SCREENING FOR OTHER SUSPECTED ENDOCRINE DISORDER: ICD-10-CM

## 2024-08-14 PROCEDURE — 99395 PREV VISIT EST AGE 18-39: CPT

## 2024-08-14 NOTE — HISTORY OF PRESENT ILLNESS
[de-identified] : no chest pain, no sob, no cough, no fever, no dizziness, no abdominal pain, no n/v/d/c/melena/brbpr/hematuria/dysuria

## 2024-08-14 NOTE — HEALTH RISK ASSESSMENT
[Good] : ~his/her~  mood as  good [Yes] : Yes [Monthly or less (1 pt)] : Monthly or less (1 point) [1 or 2 (0 pts)] : 1 or 2 (0 points) [Never (0 pts)] : Never (0 points) [No] : In the past 12 months have you used drugs other than those required for medical reasons? No [No falls in past year] : Patient reported no falls in the past year [0] : 2) Feeling down, depressed, or hopeless: Not at all (0) [Never] : Never [None] : None [With Significant Other] : lives with significant other [Employed] : employed [] :  [# Of Children ___] : has [unfilled] children [Sexually Active] : sexually active [Feels Safe at Home] : Feels safe at home [Fully functional (bathing, dressing, toileting, transferring, walking, feeding)] : Fully functional (bathing, dressing, toileting, transferring, walking, feeding) [Fully functional (using the telephone, shopping, preparing meals, housekeeping, doing laundry, using] : Fully functional and needs no help or supervision to perform IADLs (using the telephone, shopping, preparing meals, housekeeping, doing laundry, using transportation, managing medications and managing finances) [Reports changes in hearing] : Reports no changes in hearing [Reports changes in vision] : Reports no changes in vision [Reports changes in dental health] : Reports no changes in dental health [PapSmearDate] : 2023

## 2024-10-28 ENCOUNTER — APPOINTMENT (OUTPATIENT)
Dept: INTERNAL MEDICINE | Facility: CLINIC | Age: 39
End: 2024-10-28

## 2024-11-13 ENCOUNTER — APPOINTMENT (OUTPATIENT)
Dept: INTERNAL MEDICINE | Facility: CLINIC | Age: 39
End: 2024-11-13
Payer: COMMERCIAL

## 2024-11-13 VITALS
HEART RATE: 81 BPM | TEMPERATURE: 97.6 F | BODY MASS INDEX: 28.28 KG/M2 | OXYGEN SATURATION: 98 % | SYSTOLIC BLOOD PRESSURE: 128 MMHG | DIASTOLIC BLOOD PRESSURE: 78 MMHG | WEIGHT: 176 LBS | HEIGHT: 66 IN

## 2024-11-13 DIAGNOSIS — R42 DIZZINESS AND GIDDINESS: ICD-10-CM

## 2024-11-13 DIAGNOSIS — R51.9 HEADACHE, UNSPECIFIED: ICD-10-CM

## 2024-11-13 DIAGNOSIS — J32.9 CHRONIC SINUSITIS, UNSPECIFIED: ICD-10-CM

## 2024-11-13 PROCEDURE — G2211 COMPLEX E/M VISIT ADD ON: CPT | Mod: NC

## 2024-11-13 PROCEDURE — 99214 OFFICE O/P EST MOD 30 MIN: CPT

## 2024-11-13 RX ORDER — AMOXICILLIN AND CLAVULANATE POTASSIUM 875; 125 MG/1; MG/1
875-125 TABLET, COATED ORAL
Qty: 20 | Refills: 0 | Status: ACTIVE | COMMUNITY
Start: 2024-11-13 | End: 1900-01-01

## 2024-11-13 RX ORDER — METHYLPREDNISOLONE 4 MG/1
4 TABLET ORAL
Qty: 1 | Refills: 0 | Status: ACTIVE | COMMUNITY
Start: 2024-11-13 | End: 1900-01-01

## 2024-11-14 LAB
ALBUMIN SERPL ELPH-MCNC: 4.4 G/DL
ALP BLD-CCNC: 91 U/L
ALT SERPL-CCNC: 27 U/L
ANION GAP SERPL CALC-SCNC: 10 MMOL/L
AST SERPL-CCNC: 29 U/L
BASOPHILS # BLD AUTO: 0.06 K/UL
BASOPHILS NFR BLD AUTO: 1 %
BILIRUB SERPL-MCNC: 0.3 MG/DL
BUN SERPL-MCNC: 13 MG/DL
CALCIUM SERPL-MCNC: 9.6 MG/DL
CHLORIDE SERPL-SCNC: 103 MMOL/L
CO2 SERPL-SCNC: 26 MMOL/L
CREAT SERPL-MCNC: 0.85 MG/DL
EGFR: 89 ML/MIN/1.73M2
EOSINOPHIL # BLD AUTO: 0.38 K/UL
EOSINOPHIL NFR BLD AUTO: 6.3 %
GLUCOSE SERPL-MCNC: 87 MG/DL
HCT VFR BLD CALC: 39.5 %
HGB BLD-MCNC: 12.3 G/DL
IMM GRANULOCYTES NFR BLD AUTO: 0.2 %
LYMPHOCYTES # BLD AUTO: 1.92 K/UL
LYMPHOCYTES NFR BLD AUTO: 31.9 %
MAN DIFF?: NORMAL
MCHC RBC-ENTMCNC: 25.5 PG
MCHC RBC-ENTMCNC: 31.1 G/DL
MCV RBC AUTO: 82 FL
MONOCYTES # BLD AUTO: 0.4 K/UL
MONOCYTES NFR BLD AUTO: 6.7 %
NEUTROPHILS # BLD AUTO: 3.24 K/UL
NEUTROPHILS NFR BLD AUTO: 53.9 %
PLATELET # BLD AUTO: 325 K/UL
POTASSIUM SERPL-SCNC: 4.6 MMOL/L
PROT SERPL-MCNC: 7.5 G/DL
RBC # BLD: 4.82 M/UL
RBC # FLD: 14.5 %
SODIUM SERPL-SCNC: 139 MMOL/L
WBC # FLD AUTO: 6.01 K/UL

## 2025-08-27 ENCOUNTER — APPOINTMENT (OUTPATIENT)
Dept: INTERNAL MEDICINE | Facility: CLINIC | Age: 40
End: 2025-08-27
Payer: COMMERCIAL

## 2025-08-27 ENCOUNTER — NON-APPOINTMENT (OUTPATIENT)
Age: 40
End: 2025-08-27

## 2025-08-27 VITALS
DIASTOLIC BLOOD PRESSURE: 58 MMHG | BODY MASS INDEX: 26.2 KG/M2 | HEART RATE: 78 BPM | TEMPERATURE: 97.3 F | SYSTOLIC BLOOD PRESSURE: 128 MMHG | OXYGEN SATURATION: 99 % | HEIGHT: 66 IN | WEIGHT: 163 LBS

## 2025-08-27 DIAGNOSIS — Z13.220 ENCOUNTER FOR SCREENING FOR LIPOID DISORDERS: ICD-10-CM

## 2025-08-27 DIAGNOSIS — Z00.00 ENCOUNTER FOR GENERAL ADULT MEDICAL EXAMINATION W/OUT ABNORMAL FINDINGS: ICD-10-CM

## 2025-08-27 DIAGNOSIS — Z13.29 ENCOUNTER FOR SCREENING FOR OTHER SUSPECTED ENDOCRINE DISORDER: ICD-10-CM

## 2025-08-27 DIAGNOSIS — Z13.1 ENCOUNTER FOR SCREENING FOR DIABETES MELLITUS: ICD-10-CM

## 2025-08-27 PROCEDURE — 99396 PREV VISIT EST AGE 40-64: CPT

## 2025-08-27 PROCEDURE — 93000 ELECTROCARDIOGRAM COMPLETE: CPT

## 2025-09-06 DIAGNOSIS — R82.90 UNSPECIFIED ABNORMAL FINDINGS IN URINE: ICD-10-CM

## 2025-09-06 LAB
ALBUMIN SERPL ELPH-MCNC: 4.2 G/DL
ALP BLD-CCNC: 81 U/L
ALT SERPL-CCNC: 16 U/L
ANION GAP SERPL CALC-SCNC: 12 MMOL/L
APPEARANCE: CLEAR
AST SERPL-CCNC: 21 U/L
BACTERIA: ABNORMAL /HPF
BASOPHILS # BLD AUTO: 0.07 K/UL
BASOPHILS NFR BLD AUTO: 1.1 %
BILIRUB SERPL-MCNC: 0.4 MG/DL
BILIRUBIN URINE: NEGATIVE
BLOOD URINE: NEGATIVE
BUN SERPL-MCNC: 13 MG/DL
CALCIUM SERPL-MCNC: 9.2 MG/DL
CAST: 0 /LPF
CHLORIDE SERPL-SCNC: 102 MMOL/L
CHOLEST SERPL-MCNC: 158 MG/DL
CO2 SERPL-SCNC: 24 MMOL/L
COLOR: YELLOW
CREAT SERPL-MCNC: 0.86 MG/DL
EGFRCR SERPLBLD CKD-EPI 2021: 88 ML/MIN/1.73M2
EOSINOPHIL # BLD AUTO: 0.32 K/UL
EOSINOPHIL NFR BLD AUTO: 5.1 %
EPITHELIAL CELLS: 2 /HPF
ESTIMATED AVERAGE GLUCOSE: 103 MG/DL
FERRITIN SERPL-MCNC: 10 NG/ML
FOLATE SERPL-MCNC: 9.5 NG/ML
GLUCOSE QUALITATIVE U: NEGATIVE MG/DL
GLUCOSE SERPL-MCNC: 81 MG/DL
HBA1C MFR BLD HPLC: 5.2 %
HCT VFR BLD CALC: 38.1 %
HDLC SERPL-MCNC: 51 MG/DL
HGB BLD-MCNC: 12.1 G/DL
IMM GRANULOCYTES NFR BLD AUTO: 0.2 %
IRON SATN MFR SERPL: 13 %
IRON SERPL-MCNC: 43 UG/DL
KETONES URINE: NEGATIVE MG/DL
LDLC SERPL-MCNC: 92 MG/DL
LEUKOCYTE ESTERASE URINE: ABNORMAL
LYMPHOCYTES # BLD AUTO: 2.57 K/UL
LYMPHOCYTES NFR BLD AUTO: 40.9 %
MAN DIFF?: NORMAL
MCHC RBC-ENTMCNC: 25.6 PG
MCHC RBC-ENTMCNC: 31.8 G/DL
MCV RBC AUTO: 80.5 FL
MICROSCOPIC-UA: NORMAL
MONOCYTES # BLD AUTO: 0.6 K/UL
MONOCYTES NFR BLD AUTO: 9.5 %
NEUTROPHILS # BLD AUTO: 2.72 K/UL
NEUTROPHILS NFR BLD AUTO: 43.2 %
NITRITE URINE: NEGATIVE
NONHDLC SERPL-MCNC: 107 MG/DL
PH URINE: 6
PLATELET # BLD AUTO: 328 K/UL
POTASSIUM SERPL-SCNC: 4.4 MMOL/L
PROT SERPL-MCNC: 7.1 G/DL
PROTEIN URINE: NEGATIVE MG/DL
RBC # BLD: 4.73 M/UL
RBC # FLD: 15.1 %
RED BLOOD CELLS URINE: 0 /HPF
SODIUM SERPL-SCNC: 138 MMOL/L
SPECIFIC GRAVITY URINE: 1.02
TIBC SERPL-MCNC: 334 UG/DL
TRIGL SERPL-MCNC: 79 MG/DL
TSH SERPL-ACNC: 2.99 UIU/ML
UIBC SERPL-MCNC: 291 UG/DL
UROBILINOGEN URINE: 0.2 MG/DL
VIT B12 SERPL-MCNC: 820 PG/ML
WBC # FLD AUTO: 6.29 K/UL
WHITE BLOOD CELLS URINE: 125 /HPF

## 2025-09-14 PROBLEM — N30.00 ACUTE CYSTITIS WITHOUT HEMATURIA: Status: ACTIVE | Noted: 2025-09-14

## 2025-09-16 ENCOUNTER — TRANSCRIPTION ENCOUNTER (OUTPATIENT)
Age: 40
End: 2025-09-16

## 2025-09-19 ENCOUNTER — TRANSCRIPTION ENCOUNTER (OUTPATIENT)
Age: 40
End: 2025-09-19